# Patient Record
Sex: FEMALE | Race: BLACK OR AFRICAN AMERICAN | NOT HISPANIC OR LATINO | URBAN - METROPOLITAN AREA
[De-identification: names, ages, dates, MRNs, and addresses within clinical notes are randomized per-mention and may not be internally consistent; named-entity substitution may affect disease eponyms.]

---

## 2017-06-22 ENCOUNTER — EMERGENCY (EMERGENCY)
Facility: HOSPITAL | Age: 32
LOS: 1 days | Discharge: HOME | End: 2017-06-22
Attending: EMERGENCY MEDICINE | Admitting: EMERGENCY MEDICINE
Payer: SELF-PAY

## 2017-06-22 VITALS
HEART RATE: 77 BPM | SYSTOLIC BLOOD PRESSURE: 117 MMHG | DIASTOLIC BLOOD PRESSURE: 94 MMHG | RESPIRATION RATE: 16 BRPM | OXYGEN SATURATION: 100 %

## 2017-06-22 VITALS
SYSTOLIC BLOOD PRESSURE: 102 MMHG | OXYGEN SATURATION: 100 % | HEART RATE: 93 BPM | RESPIRATION RATE: 15 BRPM | DIASTOLIC BLOOD PRESSURE: 80 MMHG | TEMPERATURE: 98 F

## 2017-06-22 LAB
ALBUMIN SERPL ELPH-MCNC: 4.1 G/DL — SIGNIFICANT CHANGE UP (ref 3.3–5)
ALP SERPL-CCNC: 54 U/L — SIGNIFICANT CHANGE UP (ref 40–120)
ALT FLD-CCNC: 23 U/L — SIGNIFICANT CHANGE UP (ref 4–33)
APPEARANCE UR: CLEAR — SIGNIFICANT CHANGE UP
APTT BLD: 29 SEC — SIGNIFICANT CHANGE UP (ref 27.5–37.4)
AST SERPL-CCNC: 22 U/L — SIGNIFICANT CHANGE UP (ref 4–32)
BASOPHILS # BLD AUTO: 0.01 K/UL — SIGNIFICANT CHANGE UP (ref 0–0.2)
BASOPHILS NFR BLD AUTO: 0.2 % — SIGNIFICANT CHANGE UP (ref 0–2)
BILIRUB SERPL-MCNC: 0.4 MG/DL — SIGNIFICANT CHANGE UP (ref 0.2–1.2)
BILIRUB UR-MCNC: NEGATIVE — SIGNIFICANT CHANGE UP
BLOOD UR QL VISUAL: NEGATIVE — SIGNIFICANT CHANGE UP
BUN SERPL-MCNC: 14 MG/DL — SIGNIFICANT CHANGE UP (ref 7–23)
CALCIUM SERPL-MCNC: 9.1 MG/DL — SIGNIFICANT CHANGE UP (ref 8.4–10.5)
CHLORIDE SERPL-SCNC: 103 MMOL/L — SIGNIFICANT CHANGE UP (ref 98–107)
CK MB BLD-MCNC: 1 NG/ML — SIGNIFICANT CHANGE UP (ref 1–4.7)
CK MB BLD-MCNC: SIGNIFICANT CHANGE UP (ref 0–2.5)
CK SERPL-CCNC: 81 U/L — SIGNIFICANT CHANGE UP (ref 25–170)
CO2 SERPL-SCNC: 23 MMOL/L — SIGNIFICANT CHANGE UP (ref 22–31)
COLOR SPEC: SIGNIFICANT CHANGE UP
CREAT SERPL-MCNC: 0.93 MG/DL — SIGNIFICANT CHANGE UP (ref 0.5–1.3)
EOSINOPHIL # BLD AUTO: 0.06 K/UL — SIGNIFICANT CHANGE UP (ref 0–0.5)
EOSINOPHIL NFR BLD AUTO: 0.9 % — SIGNIFICANT CHANGE UP (ref 0–6)
GLUCOSE SERPL-MCNC: 90 MG/DL — SIGNIFICANT CHANGE UP (ref 70–99)
GLUCOSE UR-MCNC: NEGATIVE — SIGNIFICANT CHANGE UP
HCG SERPL-ACNC: 25.31 MIU/ML — SIGNIFICANT CHANGE UP
HCT VFR BLD CALC: 39.8 % — SIGNIFICANT CHANGE UP (ref 34.5–45)
HGB BLD-MCNC: 12.7 G/DL — SIGNIFICANT CHANGE UP (ref 11.5–15.5)
IMM GRANULOCYTES NFR BLD AUTO: 0 % — SIGNIFICANT CHANGE UP (ref 0–1.5)
INR BLD: 0.95 — SIGNIFICANT CHANGE UP (ref 0.88–1.17)
KETONES UR-MCNC: NEGATIVE — SIGNIFICANT CHANGE UP
LEUKOCYTE ESTERASE UR-ACNC: NEGATIVE — SIGNIFICANT CHANGE UP
LYMPHOCYTES # BLD AUTO: 2.66 K/UL — SIGNIFICANT CHANGE UP (ref 1–3.3)
LYMPHOCYTES # BLD AUTO: 40.6 % — SIGNIFICANT CHANGE UP (ref 13–44)
MCHC RBC-ENTMCNC: 27.1 PG — SIGNIFICANT CHANGE UP (ref 27–34)
MCHC RBC-ENTMCNC: 31.9 % — LOW (ref 32–36)
MCV RBC AUTO: 84.9 FL — SIGNIFICANT CHANGE UP (ref 80–100)
MONOCYTES # BLD AUTO: 0.25 K/UL — SIGNIFICANT CHANGE UP (ref 0–0.9)
MONOCYTES NFR BLD AUTO: 3.8 % — SIGNIFICANT CHANGE UP (ref 2–14)
NEUTROPHILS # BLD AUTO: 3.57 K/UL — SIGNIFICANT CHANGE UP (ref 1.8–7.4)
NEUTROPHILS NFR BLD AUTO: 54.5 % — SIGNIFICANT CHANGE UP (ref 43–77)
NITRITE UR-MCNC: NEGATIVE — SIGNIFICANT CHANGE UP
PH UR: 7.5 — SIGNIFICANT CHANGE UP (ref 4.6–8)
PLATELET # BLD AUTO: 187 K/UL — SIGNIFICANT CHANGE UP (ref 150–400)
PMV BLD: 11.3 FL — SIGNIFICANT CHANGE UP (ref 7–13)
POTASSIUM SERPL-MCNC: 4.5 MMOL/L — SIGNIFICANT CHANGE UP (ref 3.5–5.3)
POTASSIUM SERPL-SCNC: 4.5 MMOL/L — SIGNIFICANT CHANGE UP (ref 3.5–5.3)
PROT SERPL-MCNC: 7.5 G/DL — SIGNIFICANT CHANGE UP (ref 6–8.3)
PROT UR-MCNC: NEGATIVE — SIGNIFICANT CHANGE UP
PROTHROM AB SERPL-ACNC: 10.6 SEC — SIGNIFICANT CHANGE UP (ref 9.8–13.1)
RBC # BLD: 4.69 M/UL — SIGNIFICANT CHANGE UP (ref 3.8–5.2)
RBC # FLD: 14 % — SIGNIFICANT CHANGE UP (ref 10.3–14.5)
RBC CASTS # UR COMP ASSIST: SIGNIFICANT CHANGE UP (ref 0–?)
SODIUM SERPL-SCNC: 139 MMOL/L — SIGNIFICANT CHANGE UP (ref 135–145)
SP GR SPEC: 1.02 — SIGNIFICANT CHANGE UP (ref 1–1.03)
SQUAMOUS # UR AUTO: SIGNIFICANT CHANGE UP
TROPONIN T SERPL-MCNC: < 0.06 NG/ML — SIGNIFICANT CHANGE UP (ref 0–0.06)
UROBILINOGEN FLD QL: NORMAL E.U. — SIGNIFICANT CHANGE UP (ref 0.1–0.2)
WBC # BLD: 6.55 K/UL — SIGNIFICANT CHANGE UP (ref 3.8–10.5)
WBC # FLD AUTO: 6.55 K/UL — SIGNIFICANT CHANGE UP (ref 3.8–10.5)
WBC UR QL: SIGNIFICANT CHANGE UP (ref 0–?)

## 2017-06-22 PROCEDURE — 71020: CPT | Mod: 26

## 2017-06-22 PROCEDURE — 99285 EMERGENCY DEPT VISIT HI MDM: CPT | Mod: 25

## 2017-06-22 PROCEDURE — 99053 MED SERV 10PM-8AM 24 HR FAC: CPT

## 2017-06-22 PROCEDURE — 93010 ELECTROCARDIOGRAM REPORT: CPT | Mod: 59

## 2017-06-22 RX ORDER — ACETAMINOPHEN 500 MG
650 TABLET ORAL ONCE
Qty: 0 | Refills: 0 | Status: COMPLETED | OUTPATIENT
Start: 2017-06-22 | End: 2017-06-22

## 2017-06-22 RX ORDER — KETOROLAC TROMETHAMINE 30 MG/ML
15 SYRINGE (ML) INJECTION ONCE
Qty: 0 | Refills: 0 | Status: DISCONTINUED | OUTPATIENT
Start: 2017-06-22 | End: 2017-06-22

## 2017-06-22 RX ADMIN — Medication 650 MILLIGRAM(S): at 09:39

## 2017-06-22 NOTE — ED PROVIDER NOTE - ATTENDING CONTRIBUTION TO CARE
Dr. Bonilla: I have personally seen and examined this patient at the bedside. I have fully participated in the care of this patient. I have reviewed all pertinent clinical information, including history, physical exam, plan and the Resident's note and agree except as noted. HPI above as by me. PE above as by me. IMPRESSION 31F heart zero perc zero ekg nonisch likely costochondritis, upreg, cxr, nsaid dc home.

## 2017-06-22 NOTE — ED PROVIDER NOTE - MEDICAL DECISION MAKING DETAILS
Pt is a 32 y/o F w/ no PMHx who p/w right sided CP likely 2/2 costochondritis, low suspicion for ACS, PE, PTX and dissection. HEART score 0 and PERC negative. Will order CBC, CMP, Naldo x 1, PT/INR, aPTT, CXR, give toradol, repeat EKG. Patient likely to be d/kameron with f/u PMD.

## 2017-06-22 NOTE — ED PROVIDER NOTE - CARE PLAN
Principal Discharge DX:	Costochondritis  Secondary Diagnosis:	Pregnancy Principal Discharge DX:	Costochondritis  Goal:	Tylenol and Follow-up with OB  Instructions for follow-up, activity and diet:	You were seen at [facility] ER for chest pain. You had a normal EKG and CXR and lab work. You had a positive urine pregnancy test. You are to take Tylenol 650mg every 6 hours for the first three days and then as need for pain after. You should also follow-up with an OB doctor in the next week for further eval. You should return for any worsening symptoms after the first three days, any shortness or breath, or other concerns.  Secondary Diagnosis:	Pregnancy Principal Discharge DX:	Costochondritis  Goal:	Tylenol and Follow-up with OB  Instructions for follow-up, activity and diet:	You were seen at [facility] ER for chest pain. You had a normal EKG and CXR and lab work. You had a positive urine pregnancy test. You are to take Tylenol 650mg every 6 hours for the first three days and then as need for pain after. You should also follow-up with an OB doctor in the next week for further eval. You should return for any worsening symptoms after the first three days, any shortness or breath, or other concerns.  Secondary Diagnosis:	Pregnancy Principal Discharge DX:	Costochondritis  Goal:	Tylenol and Follow-up with OB  Instructions for follow-up, activity and diet:	You were seen at [facility] ER for chest pain. You had a normal EKG and CXR and lab work. You had a positive urine pregnancy test. You are to take Tylenol 650mg every 6 hours for the first three days and then as need for pain after. You should also follow-up with an OB doctor in the next week for further eval. You should return for any worsening symptoms after the first three days, any shortness or breath, or other concerns.  Secondary Diagnosis:	Pregnancy

## 2017-06-22 NOTE — ED ADULT NURSE NOTE - CHIEF COMPLAINT QUOTE
Pt recently traveled from [facility] 2 days ago.  c/o chest pain starting yesterday afternoon, states feels like a squeezing sensation.  Pt endorses SOB but denies any palpitations.  Respirations even and unlabored in triage.  Denies any PMHx.

## 2017-06-22 NOTE — ED ADULT NURSE REASSESSMENT NOTE - NS ED NURSE REASSESS COMMENT FT1
Patient received AA&Ox3 c/o muscular-like feeling of chest pain (5-6/10). VSS on RA. NSR upon scope of monitor. UCG (+) - tylenol to be given upon return from chest xray. NAD noted - will continue to monitor.

## 2017-06-22 NOTE — ED PROVIDER NOTE - PROGRESS NOTE DETAILS
Patient with positive urine Beta-hcg, no abdominal pain or vaginal bleeding, abdomen soft and NT, UA sent will get serum beta-hcg. Toradol changed to tylenol. Discussed with Patient and still desires CXR.

## 2017-06-22 NOTE — ED ADULT TRIAGE NOTE - CHIEF COMPLAINT QUOTE
Pt recently traveled from [facility] 2 days ago.  c/o chest pain starting yesterday afternoon, states feels like a squeezing sensation.  Pt endorses SOB but denies any palpitations.  Respirations even and unlabored in triage.  Denies any PMHx.

## 2017-06-22 NOTE — ED ADULT NURSE NOTE - OBJECTIVE STATEMENT
pt received aaox3 w/ c/o right sides scapula and chest pain. pt states just got back from [facility] on  Tuesday and the pain began on the plane. pt denies any SOB but very tender upon palpation. pt denies any BC usage or PMH. pt placed on cardiac monitor NS rhythm noted resp even and unlabored. IV accessed 20 gauge LAC labs sent. waiting further orders will continue to monitor.

## 2017-06-22 NOTE — ED PROVIDER NOTE - OBJECTIVE STATEMENT
Pt is a 30 yo F w/ no PMHx who p/w right sided chest pain for the past two days. Pt states that on Tuesday developed right sided sharp chest pain associated with right shoulder and neck pain. Was intermittent and resolved but then last night at 2am started to have similar CP that was sharp and squeezing and intermittent. Pt recently traveled from [facility] on Tuesday. Denies OCPs, recent hospitalization and surgery, no h/o DVT/PE, denies h/o heart disease in family, no recent change in activity or trauma. Patient denies shortness of breath, diaphoresis, nausea and vomiting. Pt is a 32 yo F w/ no PMHx who p/w right sided chest pain for the past two days. Pt states that on Tuesday developed right sided sharp chest pain associated with right shoulder and neck pain. Was intermittent and resolved but then last night at 2am started to have similar CP that was sharp and squeezing and intermittent. Pt recently traveled from [facility] on Tuesday. Denies OCPs, recent hospitalization and surgery, no h/o DVT/PE, denies h/o heart disease in family, no recent change in activity or trauma. Patient denies shortness of breath, diaphoresis, nausea and vomiting.    [facility] att: 31F neg pmh heart zero perc zero c/o cp x 2 days. Past two days cp, diffuse right sided cp, radiates to right neck, intermitt, sharp, neg relation to exertion or movement. Denies nausea, sob, pleurisy, diaphoresis, leg swelling, ocp, smoking. Recent flight to [facility], 2.5 hours long flight.

## 2017-06-22 NOTE — ED PROVIDER NOTE - PLAN OF CARE
Tylenol and Follow-up with OB You were seen at [facility] ER for chest pain. You had a normal EKG and CXR and lab work. You had a positive urine pregnancy test. You are to take Tylenol 650mg every 6 hours for the first three days and then as need for pain after. You should also follow-up with an OB doctor in the next week for further eval. You should return for any worsening symptoms after the first three days, any shortness or breath, or other concerns.

## 2017-06-23 LAB — SPECIMEN SOURCE: SIGNIFICANT CHANGE UP

## 2017-06-24 LAB — BACTERIA UR CULT: SIGNIFICANT CHANGE UP

## 2018-08-15 ENCOUNTER — EMERGENCY (EMERGENCY)
Facility: HOSPITAL | Age: 33
LOS: 1 days | Discharge: HOME | End: 2018-08-15
Attending: EMERGENCY MEDICINE | Admitting: EMERGENCY MEDICINE
Payer: COMMERCIAL

## 2018-08-15 VITALS
RESPIRATION RATE: 16 BRPM | SYSTOLIC BLOOD PRESSURE: 110 MMHG | HEART RATE: 65 BPM | DIASTOLIC BLOOD PRESSURE: 70 MMHG | TEMPERATURE: 98 F | OXYGEN SATURATION: 100 %

## 2018-08-15 VITALS
RESPIRATION RATE: 18 BRPM | SYSTOLIC BLOOD PRESSURE: 137 MMHG | OXYGEN SATURATION: 100 % | TEMPERATURE: 98 F | HEART RATE: 86 BPM | DIASTOLIC BLOOD PRESSURE: 71 MMHG

## 2018-08-15 LAB
ALBUMIN SERPL ELPH-MCNC: 4.1 G/DL — SIGNIFICANT CHANGE UP (ref 3.3–5)
ALP SERPL-CCNC: 49 U/L — SIGNIFICANT CHANGE UP (ref 40–120)
ALT FLD-CCNC: 14 U/L — SIGNIFICANT CHANGE UP (ref 4–33)
AST SERPL-CCNC: 18 U/L — SIGNIFICANT CHANGE UP (ref 4–32)
BASOPHILS # BLD AUTO: 0.01 K/UL — SIGNIFICANT CHANGE UP (ref 0–0.2)
BASOPHILS NFR BLD AUTO: 0.1 % — SIGNIFICANT CHANGE UP (ref 0–2)
BILIRUB SERPL-MCNC: 0.2 MG/DL — SIGNIFICANT CHANGE UP (ref 0.2–1.2)
BUN SERPL-MCNC: 8 MG/DL — SIGNIFICANT CHANGE UP (ref 7–23)
CALCIUM SERPL-MCNC: 8.9 MG/DL — SIGNIFICANT CHANGE UP (ref 8.4–10.5)
CHLORIDE SERPL-SCNC: 103 MMOL/L — SIGNIFICANT CHANGE UP (ref 98–107)
CO2 SERPL-SCNC: 27 MMOL/L — SIGNIFICANT CHANGE UP (ref 22–31)
CREAT SERPL-MCNC: 0.68 MG/DL — SIGNIFICANT CHANGE UP (ref 0.5–1.3)
EOSINOPHIL # BLD AUTO: 0.07 K/UL — SIGNIFICANT CHANGE UP (ref 0–0.5)
EOSINOPHIL NFR BLD AUTO: 0.9 % — SIGNIFICANT CHANGE UP (ref 0–6)
GLUCOSE SERPL-MCNC: 88 MG/DL — SIGNIFICANT CHANGE UP (ref 70–99)
HCT VFR BLD CALC: 39.4 % — SIGNIFICANT CHANGE UP (ref 34.5–45)
HGB BLD-MCNC: 12.2 G/DL — SIGNIFICANT CHANGE UP (ref 11.5–15.5)
IMM GRANULOCYTES # BLD AUTO: 0.01 # — SIGNIFICANT CHANGE UP
IMM GRANULOCYTES NFR BLD AUTO: 0.1 % — SIGNIFICANT CHANGE UP (ref 0–1.5)
LYMPHOCYTES # BLD AUTO: 2.72 K/UL — SIGNIFICANT CHANGE UP (ref 1–3.3)
LYMPHOCYTES # BLD AUTO: 34.2 % — SIGNIFICANT CHANGE UP (ref 13–44)
MCHC RBC-ENTMCNC: 26.4 PG — LOW (ref 27–34)
MCHC RBC-ENTMCNC: 31 % — LOW (ref 32–36)
MCV RBC AUTO: 85.3 FL — SIGNIFICANT CHANGE UP (ref 80–100)
MONOCYTES # BLD AUTO: 0.68 K/UL — SIGNIFICANT CHANGE UP (ref 0–0.9)
MONOCYTES NFR BLD AUTO: 8.5 % — SIGNIFICANT CHANGE UP (ref 2–14)
NEUTROPHILS # BLD AUTO: 4.47 K/UL — SIGNIFICANT CHANGE UP (ref 1.8–7.4)
NEUTROPHILS NFR BLD AUTO: 56.2 % — SIGNIFICANT CHANGE UP (ref 43–77)
NRBC # FLD: 0 — SIGNIFICANT CHANGE UP
PLATELET # BLD AUTO: 206 K/UL — SIGNIFICANT CHANGE UP (ref 150–400)
PMV BLD: 11.2 FL — SIGNIFICANT CHANGE UP (ref 7–13)
POTASSIUM SERPL-MCNC: 4.1 MMOL/L — SIGNIFICANT CHANGE UP (ref 3.5–5.3)
POTASSIUM SERPL-SCNC: 4.1 MMOL/L — SIGNIFICANT CHANGE UP (ref 3.5–5.3)
PROT SERPL-MCNC: 7.5 G/DL — SIGNIFICANT CHANGE UP (ref 6–8.3)
RBC # BLD: 4.62 M/UL — SIGNIFICANT CHANGE UP (ref 3.8–5.2)
RBC # FLD: 14.5 % — SIGNIFICANT CHANGE UP (ref 10.3–14.5)
SODIUM SERPL-SCNC: 140 MMOL/L — SIGNIFICANT CHANGE UP (ref 135–145)
WBC # BLD: 7.96 K/UL — SIGNIFICANT CHANGE UP (ref 3.8–10.5)
WBC # FLD AUTO: 7.96 K/UL — SIGNIFICANT CHANGE UP (ref 3.8–10.5)

## 2018-08-15 PROCEDURE — 99283 EMERGENCY DEPT VISIT LOW MDM: CPT

## 2018-08-15 RX ORDER — MECLIZINE HCL 12.5 MG
25 TABLET ORAL ONCE
Qty: 0 | Refills: 0 | Status: COMPLETED | OUTPATIENT
Start: 2018-08-15 | End: 2018-08-15

## 2018-08-15 RX ORDER — MECLIZINE HCL 12.5 MG
1 TABLET ORAL
Qty: 21 | Refills: 0 | OUTPATIENT
Start: 2018-08-15 | End: 2018-08-21

## 2018-08-15 RX ADMIN — Medication 25 MILLIGRAM(S): at 18:49

## 2018-08-15 NOTE — ED PROVIDER NOTE - OBJECTIVE STATEMENT
pt with 2 weeks of intermittent rt sided HA  (poorly described)  dizziness (sensation of room spinning)  no nausea  high pitched rt sided tinnitus  No associtaed URI fever cough No NSAID use pt with 2 weeks of intermittent rt sided HA  (poorly described)  dizziness (sensation of room spinning)  no nausea  high pitched rt sided tinnitus  No associated URI fever cough No NSAID use

## 2018-08-15 NOTE — ED ADULT TRIAGE NOTE - CHIEF COMPLAINT QUOTE
Pt c/o dizziness (room spinning) x 2 weeks, accompanied by tinnitus in the right ear, headache, and sensation of "electrical current" in her head

## 2018-08-15 NOTE — ED PROVIDER NOTE - PHYSICAL EXAMINATION
as below  +  Rhomgerg negative   Hallpike- no nystagmus  mild dizziness lying down  turning head to left

## 2018-08-15 NOTE — ED PROVIDER NOTE - MEDICAL DECISION MAKING DETAILS
pt with c/o dizziness with high pitched tinnitus    neuro exam nonfocal  some reproduction of sxs with position change  will p[lace on meclizine  needs further eval  with ENT

## 2018-08-15 NOTE — ED ADULT NURSE REASSESSMENT NOTE - NS ED NURSE REASSESS COMMENT FT1
report received in RW at shift change, pt VS as noted, in NAD, endorses feeling better, awaiting MD Dispo, will ctm.

## 2019-06-30 NOTE — ED PROVIDER NOTE - NS ED ATTENDING STATEMENT MOD
---
I have personally seen and examined this patient.  I have fully participated in the care of this patient. I have reviewed all pertinent clinical information, including history, physical exam, plan and the Resident’s note and agree except as noted.

## 2022-02-11 NOTE — ED ADULT TRIAGE NOTE - NS ED TRIAGE EKG
[de-identified] : URINARY FREQUENCY [FreeTextEntry6] : Previous hx of UTI\par c/o urinary freuqency for past few days\par has had 3 accidents today within 15 min\par no fever, no abdominal pain, no dysuria, no urgency\par No N,V or D\par BM yesterday, "normal"\par hx of constipation, has been on Mirilax EKG completed

## 2023-04-17 NOTE — ED PROVIDER NOTE - CROS ED GI ALL NEG
Final Diagnosis:  Pregnancy at 40w5d delivered     Complications:  None    Mode of Delivery:  spontaneous vaginal delivery    Delivery Outcome:  Liveborn Kaur    Condition on Discharge:  Recovering 39 year old female     Information for the patient's :  Arpit Man [[id number]]       Discharge Instructions:  Given to patient.    [facility].  Call for appointment.    [facility]:  [phone]  [place]:  [phone]     negative...

## 2024-01-03 ENCOUNTER — APPOINTMENT (OUTPATIENT)
Dept: OBGYN | Facility: CLINIC | Age: 39
End: 2024-01-03
Payer: OTHER GOVERNMENT

## 2024-01-03 VITALS
DIASTOLIC BLOOD PRESSURE: 87 MMHG | HEIGHT: 65 IN | WEIGHT: 196 LBS | SYSTOLIC BLOOD PRESSURE: 138 MMHG | BODY MASS INDEX: 32.65 KG/M2 | HEART RATE: 90 BPM

## 2024-01-03 DIAGNOSIS — Z01.419 ENCOUNTER FOR GYNECOLOGICAL EXAMINATION (GENERAL) (ROUTINE) W/OUT ABNORMAL FINDINGS: ICD-10-CM

## 2024-01-03 DIAGNOSIS — N64.4 MASTODYNIA: ICD-10-CM

## 2024-01-03 DIAGNOSIS — Z12.4 ENCOUNTER FOR SCREENING FOR MALIGNANT NEOPLASM OF CERVIX: ICD-10-CM

## 2024-01-03 DIAGNOSIS — N97.0 FEMALE INFERTILITY ASSOCIATED WITH ANOVULATION: ICD-10-CM

## 2024-01-03 DIAGNOSIS — N97.9 FEMALE INFERTILITY, UNSPECIFIED: ICD-10-CM

## 2024-01-03 DIAGNOSIS — U07.1 COVID-19: ICD-10-CM

## 2024-01-03 DIAGNOSIS — Z12.39 ENCOUNTER FOR OTHER SCREENING FOR MALIGNANT NEOPLASM OF BREAST: ICD-10-CM

## 2024-01-03 DIAGNOSIS — Z11.51 ENCOUNTER FOR SCREENING FOR HUMAN PAPILLOMAVIRUS (HPV): ICD-10-CM

## 2024-01-03 PROBLEM — Z00.00 ENCOUNTER FOR PREVENTIVE HEALTH EXAMINATION: Status: ACTIVE | Noted: 2024-01-03

## 2024-01-03 PROCEDURE — 99385 PREV VISIT NEW AGE 18-39: CPT

## 2024-01-03 NOTE — HISTORY OF PRESENT ILLNESS
[FreeTextEntry1] : Patient is a 38-year-old  4 para 1-0-3-1 last menstrual period 2023 Patient presents for initial GYN visit and to establish GYN care complaining of bilateral breast pain, chest pain, and patient states she has been trying to conceive for the past 3 years

## 2024-01-03 NOTE — PLAN
[FreeTextEntry1] : Patient is a 38-year-old  4 para 1-0-3-1 last menstrual period 2023 Patient presents for initial GYN visit and to establish GYN care complaining of bilateral breast pain, chest pain and discomfort, and also has been trying to conceive for the past 3 years Physical exam reveals a well-developed well-nourished female in no apparent distress,, BMI 32 Heart regular rhythm and rate, lungs clear, breast no mass nontender no skin change no nipple discharge no adenopathy, abdomen soft nontender no organomegaly Pelvic exam shows normal female external genitalia, vagina no lesions, cervix appropriate size nontender, uterus anteverted normal size nontender, adnexa no mass nontender Minimal thin white to clear vaginal discharge Pap smear was performed Vaginal cultures performed Patient will be given a prescription for breast mammogram and sonogram Patient also be given prescription for an HSG, progesterone level, and also prescription to have her  have a semen analysis performed Patient will be treated appropriately based on the vaginal cultures and of course any mammogram and sonogram findings and of course to discuss ovulation, tubal patency, and semen analysis results Patient states she had been seen in the emergency room at [facility] for evaluation of her chest discomfort,, negative findings Patient states that she has a follow-up cardiology office visit tomorrow for further evaluation Essentially benign GYN exam  Anahi was present as a chaperone for the entire assessment and examination of this patient

## 2024-01-06 LAB — HPV HIGH+LOW RISK DNA PNL CVX: NOT DETECTED

## 2024-01-09 LAB
A VAGINAE DNA VAG QL NAA+PROBE: NORMAL
BVAB2 DNA VAG QL NAA+PROBE: NORMAL
C KRUSEI DNA VAG QL NAA+PROBE: NEGATIVE
C TRACH RRNA SPEC QL NAA+PROBE: NEGATIVE
CANDIDA DNA VAG QL NAA+PROBE: NEGATIVE
CYTOLOGY CVX/VAG DOC THIN PREP: NORMAL
MEGA1 DNA VAG QL NAA+PROBE: NORMAL
N GONORRHOEA RRNA SPEC QL NAA+PROBE: NEGATIVE
T VAGINALIS RRNA SPEC QL NAA+PROBE: NEGATIVE

## 2024-01-15 ENCOUNTER — OFFICE (OUTPATIENT)
Facility: LOCATION | Age: 39
Setting detail: OPHTHALMOLOGY
End: 2024-01-15
Payer: COMMERCIAL

## 2024-01-15 DIAGNOSIS — H11.31: ICD-10-CM

## 2024-01-15 DIAGNOSIS — H05.813: ICD-10-CM

## 2024-01-15 DIAGNOSIS — R51.9: ICD-10-CM

## 2024-01-15 DIAGNOSIS — H53.433: ICD-10-CM

## 2024-01-15 DIAGNOSIS — H47.10: ICD-10-CM

## 2024-01-15 DIAGNOSIS — G93.2: ICD-10-CM

## 2024-01-15 PROCEDURE — 92202 OPSCPY EXTND ON/MAC DRAW: CPT | Performed by: OPHTHALMOLOGY

## 2024-01-15 PROCEDURE — 92083 EXTENDED VISUAL FIELD XM: CPT | Performed by: OPHTHALMOLOGY

## 2024-01-15 PROCEDURE — 92133 CPTRZD OPH DX IMG PST SGM ON: CPT | Performed by: OPHTHALMOLOGY

## 2024-01-15 PROCEDURE — 92004 COMPRE OPH EXAM NEW PT 1/>: CPT | Performed by: OPHTHALMOLOGY

## 2024-01-15 ASSESSMENT — REFRACTION_AUTOREFRACTION
OD_SPHERE: PLANO
OD_CYLINDER: -0.25
OD_AXIS: 050
OS_SPHERE: +0.25

## 2024-01-15 ASSESSMENT — CONFRONTATIONAL VISUAL FIELD TEST (CVF)
OD_FINDINGS: FULL
OS_FINDINGS: FULL

## 2024-01-17 ENCOUNTER — LABORATORY RESULT (OUTPATIENT)
Age: 39
End: 2024-01-17

## 2024-02-01 ENCOUNTER — OFFICE (OUTPATIENT)
Facility: LOCATION | Age: 39
Setting detail: OPHTHALMOLOGY
End: 2024-02-01
Payer: COMMERCIAL

## 2024-02-01 DIAGNOSIS — R51.9: ICD-10-CM

## 2024-02-01 DIAGNOSIS — H11.31: ICD-10-CM

## 2024-02-01 DIAGNOSIS — H05.813: ICD-10-CM

## 2024-02-01 DIAGNOSIS — H47.10: ICD-10-CM

## 2024-02-01 DIAGNOSIS — H53.433: ICD-10-CM

## 2024-02-01 DIAGNOSIS — G93.2: ICD-10-CM

## 2024-02-01 PROCEDURE — 92014 COMPRE OPH EXAM EST PT 1/>: CPT | Performed by: OPHTHALMOLOGY

## 2024-02-01 ASSESSMENT — REFRACTION_AUTOREFRACTION
OD_SPHERE: PLANO
OS_SPHERE: +0.25
OD_CYLINDER: -0.25
OD_AXIS: 050

## 2024-02-01 ASSESSMENT — CONFRONTATIONAL VISUAL FIELD TEST (CVF)
OD_FINDINGS: FULL
OS_FINDINGS: FULL

## 2024-02-22 ENCOUNTER — RX ONLY (RX ONLY)
Age: 39
End: 2024-02-22

## 2024-02-22 ENCOUNTER — OFFICE (OUTPATIENT)
Facility: LOCATION | Age: 39
Setting detail: OPHTHALMOLOGY
End: 2024-02-22
Payer: COMMERCIAL

## 2024-02-22 DIAGNOSIS — G43.009: ICD-10-CM

## 2024-02-22 DIAGNOSIS — H53.433: ICD-10-CM

## 2024-02-22 DIAGNOSIS — H47.10: ICD-10-CM

## 2024-02-22 DIAGNOSIS — H05.813: ICD-10-CM

## 2024-02-22 DIAGNOSIS — G93.2: ICD-10-CM

## 2024-02-22 PROCEDURE — 92133 CPTRZD OPH DX IMG PST SGM ON: CPT | Performed by: OPHTHALMOLOGY

## 2024-02-22 PROCEDURE — 92083 EXTENDED VISUAL FIELD XM: CPT | Performed by: OPHTHALMOLOGY

## 2024-02-22 PROCEDURE — 92014 COMPRE OPH EXAM EST PT 1/>: CPT | Performed by: OPHTHALMOLOGY

## 2024-02-22 ASSESSMENT — SPHEQUIV_DERIVED: OS_SPHEQUIV: 0

## 2024-02-22 ASSESSMENT — REFRACTION_AUTOREFRACTION
OS_CYLINDER: -0.50
OD_AXIS: 030
OS_SPHERE: +0.25
OD_SPHERE: PLANO
OS_AXIS: 010
OD_CYLINDER: -0.25

## 2024-02-22 ASSESSMENT — CONFRONTATIONAL VISUAL FIELD TEST (CVF)
OD_FINDINGS: FULL
OS_FINDINGS: FULL

## 2024-02-29 ENCOUNTER — OFFICE (OUTPATIENT)
Dept: URBAN - METROPOLITAN AREA CLINIC 110 | Facility: CLINIC | Age: 39
Setting detail: OPHTHALMOLOGY
End: 2024-02-29
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
## 2024-02-29 PROCEDURE — NO SHOW FE NO SHOW FEE: Performed by: STUDENT IN AN ORGANIZED HEALTH CARE EDUCATION/TRAINING PROGRAM

## 2024-04-15 ENCOUNTER — APPOINTMENT (OUTPATIENT)
Dept: NEUROSURGERY | Facility: CLINIC | Age: 39
End: 2024-04-15
Payer: COMMERCIAL

## 2024-04-15 VITALS
DIASTOLIC BLOOD PRESSURE: 79 MMHG | HEART RATE: 85 BPM | OXYGEN SATURATION: 100 % | TEMPERATURE: 98.8 F | SYSTOLIC BLOOD PRESSURE: 118 MMHG | HEIGHT: 65 IN | BODY MASS INDEX: 29.49 KG/M2 | WEIGHT: 177 LBS

## 2024-04-15 DIAGNOSIS — Z78.9 OTHER SPECIFIED HEALTH STATUS: ICD-10-CM

## 2024-04-15 DIAGNOSIS — Z82.49 FAMILY HISTORY OF ISCHEMIC HEART DISEASE AND OTHER DISEASES OF THE CIRCULATORY SYSTEM: ICD-10-CM

## 2024-04-15 DIAGNOSIS — G62.9 POLYNEUROPATHY, UNSPECIFIED: ICD-10-CM

## 2024-04-15 PROCEDURE — 99202 OFFICE O/P NEW SF 15 MIN: CPT

## 2024-04-15 RX ORDER — TIZANIDINE 2 MG/1
2 TABLET ORAL EVERY 6 HOURS
Qty: 120 | Refills: 2 | Status: ACTIVE | COMMUNITY
Start: 2024-04-15 | End: 1900-01-01

## 2024-04-15 NOTE — PHYSICAL EXAM
[General Appearance - Alert] : alert [General Appearance - In No Acute Distress] : in no acute distress [Oriented To Time, Place, And Person] : oriented to person, place, and time [Motor Tone] : muscle tone was normal in all four extremities [Motor Strength] : muscle strength was normal in all four extremities [Sensation Tactile Decrease] : light touch was intact [Abnormal Walk] : normal gait [FreeTextEntry6] : BUE/BLE 5/5 strength [de-identified] : Negative aleman's, clonus, 1+ reflexes throughout

## 2024-04-15 NOTE — ASSESSMENT
[FreeTextEntry1] : Ms. Ginna Castellon is a very pleasant 38 year old female who presents with neck pain and back pain as well as several other symptoms.  I discussed with her that several of her symptoms are unlikely to be due from her spine such as her ear sounds and the facial symptoms she is having.  I offered that she could see an ear nose and throat doctor which she declined at this time.  Regarding her neck pain and back pain, I discussed with her my recommendation for x-rays and an MRI, as well as physical therapy and pain management.  I have started her on tizanidine to see if this helps with the neck pain and back pain.  She has previously tried ibuprofen which has not helped.  She states that her chiropractor thought she had a scoliosis so I will also get scoliosis x-rays.  She has a prior diagnosis of neuropathy so I will also get an EMG/nerve conduction study test.  I will see her back in 6 weeks.  For her right transverse sinus stenosis, I will refer her to my partner Dr. Ridley. I also reached out to her neuroopthalmologist Dr. Alok Tuttle. All questions answered.  She knows to call my office with any issues or concerns.

## 2024-04-15 NOTE — HISTORY OF PRESENT ILLNESS
[FreeTextEntry1] : neck pain, back pain [de-identified] : Ms. Ginna Castellon is a very pleasant 38 year old female who presents with neck pain and back pain as well as several other symptoms.  She reports that her several years, before COVID, she has had neck pain.  The neck pain is worse on the right compared to the left.  It goes into her right shoulder and is a constant discomfort.  In the mornings her pain is minimal but throughout the day it worsens.  She does not describe specific motion or movement that makes the pain worse.  The pain is as high to 7-8 out of 10.  She has tried ibuprofen before but it does not help very much.  She sees a chiropractor does adjustments 3 times a week.  This occasionally helps.  She has low back pain for the last 2 years.  Stretching exercises make the pain better.  This pain is rated 6 out of 10.  She also previously had left buttock pain that is now gone.  She notes some tingling in her feet and says that she was diagnosed with a neuropathy in the [place] previously.  Her legs get cramps and she feels a little pinching sensations all throughout her legs.  She also notes right tension temporal headache, pain that extends on the left side behind her ear, noises in her right ear as well as sensations throughout her mouth. She has not tried physical therapy or pain management.  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   PMH: as above, on diamox for papilledema PSH: none Lives at home with son, brother in law and sister in law Previously worked as diplomat

## 2024-04-24 ENCOUNTER — APPOINTMENT (OUTPATIENT)
Dept: MRI IMAGING | Facility: CLINIC | Age: 39
End: 2024-04-24
Payer: COMMERCIAL

## 2024-04-24 ENCOUNTER — OUTPATIENT (OUTPATIENT)
Dept: OUTPATIENT SERVICES | Facility: HOSPITAL | Age: 39
LOS: 1 days | End: 2024-04-24

## 2024-04-24 DIAGNOSIS — M54.9 DORSALGIA, UNSPECIFIED: ICD-10-CM

## 2024-04-24 PROCEDURE — 72148 MRI LUMBAR SPINE W/O DYE: CPT | Mod: 26

## 2024-04-24 PROCEDURE — 72141 MRI NECK SPINE W/O DYE: CPT | Mod: 26

## 2024-05-02 ENCOUNTER — APPOINTMENT (OUTPATIENT)
Dept: NEUROSURGERY | Facility: CLINIC | Age: 39
End: 2024-05-02
Payer: COMMERCIAL

## 2024-05-02 VITALS
SYSTOLIC BLOOD PRESSURE: 126 MMHG | HEART RATE: 84 BPM | TEMPERATURE: 98.7 F | DIASTOLIC BLOOD PRESSURE: 85 MMHG | WEIGHT: 179 LBS | OXYGEN SATURATION: 100 % | HEIGHT: 65 IN | BODY MASS INDEX: 29.82 KG/M2

## 2024-05-02 DIAGNOSIS — H93.A1 PULSATILE TINNITUS, RIGHT EAR: ICD-10-CM

## 2024-05-02 DIAGNOSIS — M54.2 CERVICALGIA: ICD-10-CM

## 2024-05-02 DIAGNOSIS — M54.9 DORSALGIA, UNSPECIFIED: ICD-10-CM

## 2024-05-02 PROCEDURE — 99214 OFFICE O/P EST MOD 30 MIN: CPT

## 2024-05-02 PROCEDURE — 99212 OFFICE O/P EST SF 10 MIN: CPT

## 2024-05-02 NOTE — PHYSICAL EXAM
He just had refills for both will be due for refill of iron if he is taking BID then date would be end of Jan 2020 or if every day would be April 2020  For lasix is due end of jan 2020     Thank you  Soha Potter CNP     [FreeTextEntry1] : Constitutional: NAD Neuro * Mental Status:  GCS 15: Awake, alert, oriented to conversation. No aphasia or difficulty speaking. No dysarthria. * Cranial Nerves: Cnii-Cnxii grossly intact. EOMI, tongue midline, no gaze deviation, no facial droop * Motor: b/l UE and LE intact * Sensory: Sensation intact to light touch * Reflexes: not assessed Cardiovascular: Regular rate and rhythm. Eyes: See neurologic examination with detailed examination of eyes. Respiratory: non labored breathing Musculoskeletal: No muscle wasting noted Skin: grossly intact

## 2024-05-02 NOTE — ASSESSMENT
[FreeTextEntry1] : 38F with PMH papilledema who presents with R ear noises, possible pulsatile tinnitus. MRV shows R transverse sinus stenosis. She was referred for workup of stenosis. However, her pulsatile tinnitus has now resolved.   Plan: - Due to resolution of symptoms, no further neuro intervention required at this time - Continue diamox as prescribed by Dr. Larsen - If headaches worsen in frequency or intensity or pulsatile tinnitus resumes, follow up for further care - Patient in agreement with plan

## 2024-05-02 NOTE — ASSESSMENT
[FreeTextEntry1] : Ms. Ginna Castellon is a very pleasant 38 year old female who presents for follow-up of neck pain and back pain as well as several other symptoms.  Fortunately the majority of her symptoms have improved on their own with stretching.  I discussed with her that she should continue physical therapy just for maintenance of strength and conditioning.  I would like to see her back in 2 to 3 months.  All questions answered.  She knows to call my office with any issues or concerns

## 2024-05-02 NOTE — HISTORY OF PRESENT ILLNESS
[FreeTextEntry1] : Ms. Ginna Castellon is a 38F with PMH papilledema who presents as referral from Dr. Neli Rosales for evaluation of R transverse sinus stenosis. She originally saw Dr. Rosales for neck pain and also reported a R tension temporal headache and noises in her R ear. She underwent MRV which showed R transverse sinus stenosis. She reports that her pulsatile tinnitus has now resolved in the past 2 weeks. When she was having symptoms, they were present in the R ear, consistent with heart beat. She was started on Diamox 500 BID for papilledema in February 2024. Admits to rare headaches, and otherwise denies weakness, numbness, tingling, difficulty walking, difficulty speaking, dizziness.   Neuro ophthalmology: Dr. Alok Larsen

## 2024-05-02 NOTE — HISTORY OF PRESENT ILLNESS
[FreeTextEntry1] : Ms. Ginna Castellon is a very pleasant 38 year old female who presents for follow-up of neck pain and back pain as well as several other symptoms.  She was initially seen in mid April and at that time was recommended for conservative therapy and imaging.  She reports that since her last visit, she has been doing stretching at home which has fortunately improved her pain.  Her headaches and tinnitus have also resolved.  She has not done official physical therapy or pain management.

## 2024-06-06 ENCOUNTER — OFFICE (OUTPATIENT)
Facility: LOCATION | Age: 39
Setting detail: OPHTHALMOLOGY
End: 2024-06-06
Payer: COMMERCIAL

## 2024-06-06 DIAGNOSIS — H53.433: ICD-10-CM

## 2024-06-06 DIAGNOSIS — G93.2: ICD-10-CM

## 2024-06-06 DIAGNOSIS — G43.009: ICD-10-CM

## 2024-06-06 DIAGNOSIS — H05.813: ICD-10-CM

## 2024-06-06 DIAGNOSIS — H47.10: ICD-10-CM

## 2024-06-06 PROCEDURE — 92014 COMPRE OPH EXAM EST PT 1/>: CPT | Performed by: OPHTHALMOLOGY

## 2024-06-06 PROCEDURE — 92133 CPTRZD OPH DX IMG PST SGM ON: CPT | Performed by: OPHTHALMOLOGY

## 2024-06-06 PROCEDURE — 92083 EXTENDED VISUAL FIELD XM: CPT | Performed by: OPHTHALMOLOGY

## 2024-06-06 ASSESSMENT — CONFRONTATIONAL VISUAL FIELD TEST (CVF)
OS_FINDINGS: FULL
OD_FINDINGS: FULL

## 2024-09-26 ENCOUNTER — OFFICE (OUTPATIENT)
Facility: LOCATION | Age: 39
Setting detail: OPHTHALMOLOGY
End: 2024-09-26
Payer: COMMERCIAL

## 2024-09-26 DIAGNOSIS — H16.223: ICD-10-CM

## 2024-09-26 DIAGNOSIS — H05.813: ICD-10-CM

## 2024-09-26 DIAGNOSIS — H16.222: ICD-10-CM

## 2024-09-26 DIAGNOSIS — H16.221: ICD-10-CM

## 2024-09-26 DIAGNOSIS — H47.10: ICD-10-CM

## 2024-09-26 DIAGNOSIS — H53.433: ICD-10-CM

## 2024-09-26 PROCEDURE — 92083 EXTENDED VISUAL FIELD XM: CPT | Performed by: OPHTHALMOLOGY

## 2024-09-26 PROCEDURE — 83861 MICROFLUID ANALY TEARS: CPT | Mod: RT | Performed by: OPHTHALMOLOGY

## 2024-09-26 PROCEDURE — 92133 CPTRZD OPH DX IMG PST SGM ON: CPT | Performed by: OPHTHALMOLOGY

## 2024-09-26 PROCEDURE — 83861 MICROFLUID ANALY TEARS: CPT | Mod: LT | Performed by: OPHTHALMOLOGY

## 2024-09-26 PROCEDURE — 92012 INTRM OPH EXAM EST PATIENT: CPT | Performed by: OPHTHALMOLOGY

## 2024-09-26 ASSESSMENT — CONFRONTATIONAL VISUAL FIELD TEST (CVF)
OD_FINDINGS: FULL
OS_FINDINGS: FULL

## 2024-10-01 ENCOUNTER — APPOINTMENT (OUTPATIENT)
Dept: OBGYN | Facility: CLINIC | Age: 39
End: 2024-10-01
Payer: COMMERCIAL

## 2024-10-01 VITALS
SYSTOLIC BLOOD PRESSURE: 136 MMHG | HEIGHT: 65 IN | HEART RATE: 90 BPM | WEIGHT: 181 LBS | DIASTOLIC BLOOD PRESSURE: 83 MMHG | BODY MASS INDEX: 30.16 KG/M2

## 2024-10-01 DIAGNOSIS — Z12.39 ENCOUNTER FOR OTHER SCREENING FOR MALIGNANT NEOPLASM OF BREAST: ICD-10-CM

## 2024-10-01 DIAGNOSIS — N92.6 IRREGULAR MENSTRUATION, UNSPECIFIED: ICD-10-CM

## 2024-10-01 DIAGNOSIS — N64.4 MASTODYNIA: ICD-10-CM

## 2024-10-01 DIAGNOSIS — Z01.419 ENCOUNTER FOR GYNECOLOGICAL EXAMINATION (GENERAL) (ROUTINE) W/OUT ABNORMAL FINDINGS: ICD-10-CM

## 2024-10-01 DIAGNOSIS — Z12.4 ENCOUNTER FOR SCREENING FOR MALIGNANT NEOPLASM OF CERVIX: ICD-10-CM

## 2024-10-01 DIAGNOSIS — Z11.51 ENCOUNTER FOR SCREENING FOR HUMAN PAPILLOMAVIRUS (HPV): ICD-10-CM

## 2024-10-01 PROCEDURE — 99213 OFFICE O/P EST LOW 20 MIN: CPT

## 2024-10-01 PROCEDURE — 99459 PELVIC EXAMINATION: CPT

## 2024-10-07 ENCOUNTER — ASOB RESULT (OUTPATIENT)
Age: 39
End: 2024-10-07

## 2024-10-07 ENCOUNTER — APPOINTMENT (OUTPATIENT)
Dept: OBGYN | Facility: CLINIC | Age: 39
End: 2024-10-07
Payer: COMMERCIAL

## 2024-10-07 VITALS
HEART RATE: 80 BPM | HEIGHT: 65 IN | SYSTOLIC BLOOD PRESSURE: 109 MMHG | DIASTOLIC BLOOD PRESSURE: 79 MMHG | BODY MASS INDEX: 30.49 KG/M2 | WEIGHT: 183 LBS

## 2024-10-07 VITALS
HEART RATE: 79 BPM | SYSTOLIC BLOOD PRESSURE: 120 MMHG | DIASTOLIC BLOOD PRESSURE: 79 MMHG | WEIGHT: 188 LBS | BODY MASS INDEX: 33.31 KG/M2 | HEIGHT: 63 IN

## 2024-10-07 DIAGNOSIS — R10.2 PELVIC AND PERINEAL PAIN: ICD-10-CM

## 2024-10-07 DIAGNOSIS — Z78.9 OTHER SPECIFIED HEALTH STATUS: ICD-10-CM

## 2024-10-07 DIAGNOSIS — Z87.39 PERSONAL HISTORY OF OTHER DISEASES OF THE MUSCULOSKELETAL SYSTEM AND CONNECTIVE TISSUE: ICD-10-CM

## 2024-10-07 DIAGNOSIS — Z11.3 ENCOUNTER FOR SCREENING FOR INFECTIONS WITH A PREDOMINANTLY SEXUAL MODE OF TRANSMISSION: ICD-10-CM

## 2024-10-07 PROCEDURE — 76830 TRANSVAGINAL US NON-OB: CPT

## 2024-10-07 PROCEDURE — 76856 US EXAM PELVIC COMPLETE: CPT | Mod: 59

## 2024-10-07 PROCEDURE — 99213 OFFICE O/P EST LOW 20 MIN: CPT | Mod: 25

## 2025-01-09 ENCOUNTER — OFFICE (OUTPATIENT)
Facility: LOCATION | Age: 40
Setting detail: OPHTHALMOLOGY
End: 2025-01-09
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
## 2025-01-09 PROCEDURE — NO SHOW FE NO SHOW FEE: Performed by: OPHTHALMOLOGY
